# Patient Record
Sex: FEMALE | Race: WHITE | NOT HISPANIC OR LATINO | Employment: FULL TIME | URBAN - NONMETROPOLITAN AREA
[De-identification: names, ages, dates, MRNs, and addresses within clinical notes are randomized per-mention and may not be internally consistent; named-entity substitution may affect disease eponyms.]

---

## 2023-02-09 PROBLEM — R10.11 POSTPRANDIAL RUQ PAIN: Status: RESOLVED | Noted: 2023-02-09 | Resolved: 2023-02-09

## 2023-02-09 PROBLEM — J32.9 SINUSITIS: Status: RESOLVED | Noted: 2023-02-09 | Resolved: 2023-02-09

## 2023-02-09 PROBLEM — R10.9 FLANK PAIN: Status: RESOLVED | Noted: 2023-02-09 | Resolved: 2023-02-09

## 2023-02-09 PROBLEM — M54.9 BACK PAIN: Status: ACTIVE | Noted: 2023-02-09

## 2023-02-09 PROBLEM — R19.8 RECTAL PRESSURE: Status: RESOLVED | Noted: 2023-02-09 | Resolved: 2023-02-09

## 2023-02-09 PROBLEM — R10.9 ABDOMINAL PAIN: Status: RESOLVED | Noted: 2023-02-09 | Resolved: 2023-02-09

## 2023-02-09 PROBLEM — M54.2 NECK PAIN: Status: ACTIVE | Noted: 2023-02-09

## 2023-02-09 RX ORDER — TRAMADOL HYDROCHLORIDE 50 MG/1
50 TABLET ORAL EVERY 4 HOURS PRN
COMMUNITY
Start: 2021-03-20 | End: 2023-03-28

## 2023-02-09 RX ORDER — NALOXONE HYDROCHLORIDE 4 MG/.1ML
4 SPRAY NASAL AS NEEDED
COMMUNITY
Start: 2021-10-01 | End: 2023-09-01 | Stop reason: ALTCHOICE

## 2023-04-14 ENCOUNTER — OFFICE VISIT (OUTPATIENT)
Dept: PRIMARY CARE | Facility: CLINIC | Age: 63
End: 2023-04-14
Payer: COMMERCIAL

## 2023-04-14 VITALS
HEART RATE: 78 BPM | HEIGHT: 68 IN | SYSTOLIC BLOOD PRESSURE: 123 MMHG | DIASTOLIC BLOOD PRESSURE: 76 MMHG | WEIGHT: 132.7 LBS | BODY MASS INDEX: 20.11 KG/M2

## 2023-04-14 DIAGNOSIS — Z12.2 ENCOUNTER FOR SCREENING FOR LUNG CANCER: ICD-10-CM

## 2023-04-14 DIAGNOSIS — Z79.899 CONTROLLED SUBSTANCE AGREEMENT SIGNED: ICD-10-CM

## 2023-04-14 DIAGNOSIS — F17.200 SMOKER: ICD-10-CM

## 2023-04-14 DIAGNOSIS — Z13.220 SCREENING FOR LIPID DISORDERS: ICD-10-CM

## 2023-04-14 DIAGNOSIS — Z00.00 WELLNESS EXAMINATION: Primary | ICD-10-CM

## 2023-04-14 DIAGNOSIS — R53.83 FATIGUE, UNSPECIFIED TYPE: ICD-10-CM

## 2023-04-14 PROCEDURE — 99214 OFFICE O/P EST MOD 30 MIN: CPT | Performed by: INTERNAL MEDICINE

## 2023-04-14 PROCEDURE — 99396 PREV VISIT EST AGE 40-64: CPT | Performed by: INTERNAL MEDICINE

## 2023-04-14 PROCEDURE — 4004F PT TOBACCO SCREEN RCVD TLK: CPT | Performed by: INTERNAL MEDICINE

## 2023-04-14 ASSESSMENT — ENCOUNTER SYMPTOMS
RHINORRHEA: 0
HEADACHES: 0
DYSURIA: 0
APPETITE CHANGE: 0
NUMBNESS: 0
SHORTNESS OF BREATH: 0
EYE DISCHARGE: 0
CONSTIPATION: 0
WEAKNESS: 0
NECK PAIN: 0
FREQUENCY: 0
ABDOMINAL DISTENTION: 0
ABDOMINAL PAIN: 0
SORE THROAT: 0
HALLUCINATIONS: 0
BLOOD IN STOOL: 0
SINUS PRESSURE: 0
ACTIVITY CHANGE: 0
TROUBLE SWALLOWING: 0
COUGH: 0
NAUSEA: 0
DIZZINESS: 0
UNEXPECTED WEIGHT CHANGE: 0
VOMITING: 0
FATIGUE: 1
DIARRHEA: 0
ARTHRALGIAS: 0
FEVER: 0
NERVOUS/ANXIOUS: 0
BACK PAIN: 0

## 2023-04-14 ASSESSMENT — PATIENT HEALTH QUESTIONNAIRE - PHQ9
2. FEELING DOWN, DEPRESSED OR HOPELESS: NOT AT ALL
1. LITTLE INTEREST OR PLEASURE IN DOING THINGS: NOT AT ALL
SUM OF ALL RESPONSES TO PHQ9 QUESTIONS 1 AND 2: 0

## 2023-04-14 ASSESSMENT — LIFESTYLE VARIABLES: TOTAL SCORE: 0

## 2023-04-14 NOTE — PROGRESS NOTES
"Subjective   Patient ID: Cherelle Jimenez is a 62 y.o. female who presents for Follow-up (Patient complaining of tiredness. Patient inquiring about when she should have a \"physical\".).  HPI  Patient is here today for follow up with a chief complaint of  fatigue.  Pt currently is smoking 1 ppd and has for 25 years.   She has not been able to successfully quit for long.  Patient has nicotine patches without success , did not tolerate chantix or wellbutrin in the past.     Review of Systems   Constitutional:  Positive for fatigue. Negative for activity change, appetite change, fever and unexpected weight change.   HENT:  Negative for congestion, ear discharge, ear pain, nosebleeds, postnasal drip, rhinorrhea, sinus pressure, sneezing, sore throat, tinnitus and trouble swallowing.    Eyes:  Negative for discharge.   Respiratory:  Negative for cough and shortness of breath.    Cardiovascular:  Negative for chest pain.   Gastrointestinal:  Negative for abdominal distention, abdominal pain, blood in stool, constipation, diarrhea, nausea and vomiting.   Endocrine: Negative for cold intolerance.   Genitourinary:  Negative for dysuria and frequency.   Musculoskeletal:  Negative for arthralgias, back pain and neck pain.   Skin:  Negative for rash.   Neurological:  Negative for dizziness, weakness, numbness and headaches.   Psychiatric/Behavioral:  Negative for hallucinations. The patient is not nervous/anxious.        Objective   /76   Pulse 78   Ht 1.727 m (5' 8\")   Wt 60.2 kg (132 lb 11.2 oz)   BMI 20.18 kg/m²     Physical Exam  Constitutional:       General: She is not in acute distress.     Appearance: Normal appearance. She is not toxic-appearing.   HENT:      Head: Normocephalic and atraumatic.      Right Ear: Tympanic membrane, ear canal and external ear normal.      Left Ear: Tympanic membrane, ear canal and external ear normal.      Nose: Nose normal. No congestion.      Mouth/Throat:      Mouth: Mucous " membranes are moist.      Pharynx: Oropharynx is clear. No oropharyngeal exudate.   Eyes:      General: No scleral icterus.        Right eye: No discharge.         Left eye: No discharge.      Extraocular Movements: Extraocular movements intact.      Conjunctiva/sclera: Conjunctivae normal.      Pupils: Pupils are equal, round, and reactive to light.   Neck:      Vascular: No carotid bruit.   Cardiovascular:      Rate and Rhythm: Normal rate and regular rhythm.      Heart sounds: No murmur heard.     No friction rub. No gallop.   Pulmonary:      Effort: Pulmonary effort is normal. No respiratory distress.      Breath sounds: Normal breath sounds.   Abdominal:      General: Bowel sounds are normal. There is no distension.      Palpations: Abdomen is soft. There is no mass.      Tenderness: There is no abdominal tenderness. There is no guarding.   Musculoskeletal:         General: No swelling. Normal range of motion.      Cervical back: Normal range of motion and neck supple. No tenderness.   Lymphadenopathy:      Cervical: No cervical adenopathy.   Skin:     General: Skin is warm and dry.      Comments: Multiple solar lentigines, freckles, sun damange but no concerning moles, scattered hemangiomas, two dermatofibromas on right anterior shoulder    Neurological:      General: No focal deficit present.      Mental Status: She is alert and oriented to person, place, and time.   Psychiatric:         Mood and Affect: Mood normal.         Thought Content: Thought content normal.         Judgment: Judgment normal.         Flu shots declines   PNA: recommended   Shingles: recommended   COVID: received     Mammo 10 years ago, declines to update   Pap 10 years ago, declines to update   DEXA: --  Colonoscopy 8/21  Lung cancer screening: will order low dose lung cancer screening as has smoked for 25 years 1 ppd    OARRS:  Dinora Castellanos DO on 4/14/2023 11:19 AM  I have personally reviewed the OARRS report for Cherelle SOLIS  Barbara. I have considered the risks of abuse, dependence, addiction and diversion    Is the patient prescribed a combination of a benzodiazepine and opioid?  Yes, I feel it is clincially indicated to continue the medication and have discussed with the patient risks/benefits/alternatives.    Last Urine Drug Screen / ordered today: Yes  Recent Results (from the past 85951 hour(s))   OPIATE/OPIOID/BENZO PRESCRIPTION COMPLIANCE    Collection Time: 10/01/21 11:06 AM   Result Value Ref Range    DRUG SCREEN COMMENT URINE SEE BELOW     Creatine, Urine 181.1 mg/dL    Amphetamine Screen, Urine PRESUMPTIVE NEGATIVE NEGATIVE    Barbiturate Screen, Urine PRESUMPTIVE NEGATIVE NEGATIVE    Cannabinoid Screen, Urine PRESUMPTIVE NEGATIVE NEGATIVE    Cocaine Screen, Urine PRESUMPTIVE NEGATIVE NEGATIVE    PCP Screen, Urine PRESUMPTIVE NEGATIVE NEGATIVE    7-Aminoclonazepam <25 Cutoff <25 ng/mL    Alpha-Hydroxyalprazolam <25 Cutoff <25 ng/mL    Alpha-Hydroxymidazolam <25 Cutoff <25 ng/mL    Alprazolam <25 Cutoff <25 ng/mL    Chlordiazepoxide <25 Cutoff <25 ng/mL    Clonazepam <25 Cutoff <25 ng/mL    Diazepam <25 Cutoff <25 ng/mL    Lorazepam <25 Cutoff <25 ng/mL    Midazolam <25 Cutoff <25 ng/mL    Nordiazepam <25 Cutoff <25 ng/mL    Oxazepam <25 Cutoff <25 ng/mL    Temazepam <25 Cutoff <25 ng/mL    Zolpidem <25 Cutoff <25 ng/mL    Zolpidem Metabolite (ZCA) <25 Cutoff <25 ng/mL    6-Acetylmorphine <25 Cutoff <25 ng/mL    Codeine <50 Cutoff <50 ng/mL    Hydrocodone <25 Cutoff <25 ng/mL    Hydromorphone <25 Cutoff <25 ng/mL    Morphine Urine <50 Cutoff <50 ng/mL    Norhydrocodone <25 Cutoff <25 ng/mL    Noroxycodone <25 Cutoff <25 ng/mL    Oxycodone <25 Cutoff <25 ng/mL    Oxymorphone <25 Cutoff <25 ng/mL    Tramadol >1000 (A) Cutoff <50 ng/mL    O-Desmethyltramadol >1000 (A) Cutoff <50 ng/mL    Fentanyl <2.5 Cutoff<2.5 ng/mL    Norfentanyl <2.5 Cutoff<2.5 ng/mL    METHADONE CONFIRMATION,URINE <25 Cutoff <25 ng/mL    EDDP <25 Cutoff <25  ng/mL     N/A    Controlled Substance Agreement:  Date of the Last Agreement: 2023  Reviewed Controlled Substance Agreement including but not limited to the benefits, risks, and alternatives to treatment with a Controlled Substance medication(s).    Opioids:  What is the patient's goal of therapy? Improvement of pain  Is this being achieved with current treatment? yes    I have calculated the patient's Morphine Dose Equivalent (MED):   I have considered referral to Pain Management and/or a specialist, and do not feel it is necessary at this time.    I feel that it is clinically indicated to continue this current medication regimen after consideration of alternative therapies, and other non-opioid treatment.    Opioid Risk Screening:  Family History of Substance Abuse  Alcohol: 0 (2023 11:00 AM)  Illegal Dru (2023 11:00 AM)  Prescription Dru (2023 11:00 AM)    Personal History of Substance Abuse  Alcohol: 0 (2023 11:00 AM)  Illegal Drugs: 0 (2023 11:00 AM)  Prescription Drugs: 0 (2023 11:00 AM)    Patient Age (16-45)  Age (16-45): 0 (2023 11:00 AM)    History of Preadolescent Sexual Abuse  History of Preadolescent Sexual Abuse: 0 (2023 11:00 AM)    Psychological Disease  Attention Deficit Disorder, Obsessive Compulsive Disorder, Bipolar, Schizophrenia: 0 (2023 11:00 AM)  Depression: 0 (2023 11:00 AM)    Total Score  Total Score: 0 (2023 11:00 AM)    Total Score Risk Category  TOTAL SCORE CATEGORY: Low Risk (0-3) (2023 11:00 AM)        Pain Assessment:  Analgesia  What was your pain level on average during the past week?: 4  What was your pain level at its worst during the past week?: 6  What percentage of your pain has been relieved during the past week?: 80 %  Is the amount of pain relief you are now obtaining from your current pain relievers enough to make a real difference in your life?: Y  Query to Clinician: Is the patient's pain relief  clinically significant?: Yes    Activities of Daily Living  Physical Functioning: Better  Family Relationships: Better  Social Relationships: Better  Mood: Better  Sleep Patterns: Better  Overall Functioning: Better    Adverse Events  Is patient experiencing any side effects from current pain relievers?: N  Patient's Overall Severity of Side Effects: None      Assessment  Is your overall impression that this patient is benefiting from opioid therapy?: Yes  Specific Analgesic Plan: Continue present regimen          Assessment/Plan   Problem List Items Addressed This Visit          Other    Smoker     Other Visit Diagnoses       Wellness examination    -  Primary    Relevant Orders    Comprehensive Metabolic Panel    CBC and Auto Differential    Screening for lipid disorders        Relevant Orders    Lipid Panel    Fatigue, unspecified type        Relevant Orders    TSH with reflex to Free T4 if abnormal    Iron and TIBC    Vitamin B12    Vitamin D 25-Hydroxy,Total    Controlled substance agreement signed        Relevant Orders    Opiate/Opioid/Benzo Extended Prescription Compliance    Encounter for screening for lung cancer        Relevant Orders    CT lung screening low dose          1 Wellness exam  - will order cbc, cmp, lipid panel  - pt declines physical breast exam   - declines mammo  - declines pap   - colonoscopy up to date   - can do dexa next year at 63   - eligible for lung cancer screening CT chest with 25 years smoking 1 ppd   - discussed smoking cessation, has not had success with nicotine replacement, declines chantix or wellbutrin as she did not tolerate it in the past     2. 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 Chronic neck and back pain  - was seeing pain Management in [place], he retired   - I have personally reviewed this patient's OARRS report and found it to be appropriate. The report has been uploaded into  the medical record. I have considered the risks of abuse, addiction, dependence, and diversion and feel that it is clinically appropriate for this patient to be prescribed this controlled medication.  - updated CSA today, Ordered UDS  - neck injections did not help, was recommend to see neurosurgery for surgical eval but pt declined as she does not want surgery         Final diagnoses:   [Z00.00] Wellness examination   [Z13.220] Screening for lipid disorders   [R53.83] Fatigue, unspecified type   [Z79.899] Controlled substance agreement signed   [Z12.2] Encounter for screening for lung cancer   [F17.200] Smoker

## 2023-05-05 ENCOUNTER — LAB (OUTPATIENT)
Dept: LAB | Facility: LAB | Age: 63
End: 2023-05-05
Payer: COMMERCIAL

## 2023-05-05 DIAGNOSIS — Z79.899 CONTROLLED SUBSTANCE AGREEMENT SIGNED: ICD-10-CM

## 2023-05-05 DIAGNOSIS — R53.83 FATIGUE, UNSPECIFIED TYPE: ICD-10-CM

## 2023-05-05 DIAGNOSIS — Z00.00 WELLNESS EXAMINATION: ICD-10-CM

## 2023-05-05 DIAGNOSIS — Z13.220 SCREENING FOR LIPID DISORDERS: ICD-10-CM

## 2023-05-05 LAB
ALANINE AMINOTRANSFERASE (SGPT) (U/L) IN SER/PLAS: 17 U/L (ref 7–45)
ALBUMIN (G/DL) IN SER/PLAS: 4.2 G/DL (ref 3.4–5)
ALKALINE PHOSPHATASE (U/L) IN SER/PLAS: 71 U/L (ref 33–136)
ANION GAP IN SER/PLAS: 12 MMOL/L (ref 10–20)
ASPARTATE AMINOTRANSFERASE (SGOT) (U/L) IN SER/PLAS: 17 U/L (ref 9–39)
BASOPHILS (10*3/UL) IN BLOOD BY AUTOMATED COUNT: 0.04 X10E9/L (ref 0–0.1)
BASOPHILS/100 LEUKOCYTES IN BLOOD BY AUTOMATED COUNT: 0.7 % (ref 0–2)
BILIRUBIN TOTAL (MG/DL) IN SER/PLAS: 0.8 MG/DL (ref 0–1.2)
CALCIDIOL (25 OH VITAMIN D3) (NG/ML) IN SER/PLAS: 27 NG/ML
CALCIUM (MG/DL) IN SER/PLAS: 9.7 MG/DL (ref 8.6–10.3)
CARBON DIOXIDE, TOTAL (MMOL/L) IN SER/PLAS: 30 MMOL/L (ref 21–32)
CHLORIDE (MMOL/L) IN SER/PLAS: 104 MMOL/L (ref 98–107)
CHOLESTEROL (MG/DL) IN SER/PLAS: 231 MG/DL (ref 0–199)
CHOLESTEROL IN HDL (MG/DL) IN SER/PLAS: 66 MG/DL
CHOLESTEROL/HDL RATIO: 3.5
COBALAMIN (VITAMIN B12) (PG/ML) IN SER/PLAS: 239 PG/ML (ref 211–911)
CREATININE (MG/DL) IN SER/PLAS: 0.92 MG/DL (ref 0.5–1.05)
EOSINOPHILS (10*3/UL) IN BLOOD BY AUTOMATED COUNT: 0.18 X10E9/L (ref 0–0.7)
EOSINOPHILS/100 LEUKOCYTES IN BLOOD BY AUTOMATED COUNT: 3.1 % (ref 0–6)
ERYTHROCYTE DISTRIBUTION WIDTH (RATIO) BY AUTOMATED COUNT: 12.8 % (ref 11.5–14.5)
ERYTHROCYTE MEAN CORPUSCULAR HEMOGLOBIN CONCENTRATION (G/DL) BY AUTOMATED: 33 G/DL (ref 32–36)
ERYTHROCYTE MEAN CORPUSCULAR VOLUME (FL) BY AUTOMATED COUNT: 99 FL (ref 80–100)
ERYTHROCYTES (10*6/UL) IN BLOOD BY AUTOMATED COUNT: 4.6 X10E12/L (ref 4–5.2)
GFR FEMALE: 69 ML/MIN/1.73M2
GLUCOSE (MG/DL) IN SER/PLAS: 95 MG/DL (ref 74–99)
HEMATOCRIT (%) IN BLOOD BY AUTOMATED COUNT: 45.5 % (ref 36–46)
HEMOGLOBIN (G/DL) IN BLOOD: 15 G/DL (ref 12–16)
IMMATURE GRANULOCYTES/100 LEUKOCYTES IN BLOOD BY AUTOMATED COUNT: 0.3 % (ref 0–0.9)
IRON (UG/DL) IN SER/PLAS: 108 UG/DL (ref 35–150)
IRON BINDING CAPACITY (UG/DL) IN SER/PLAS: 360 UG/DL (ref 240–445)
IRON SATURATION (%) IN SER/PLAS: 30 % (ref 25–45)
LDL: 150 MG/DL (ref 0–99)
LEUKOCYTES (10*3/UL) IN BLOOD BY AUTOMATED COUNT: 5.9 X10E9/L (ref 4.4–11.3)
LYMPHOCYTES (10*3/UL) IN BLOOD BY AUTOMATED COUNT: 1.89 X10E9/L (ref 1.2–4.8)
LYMPHOCYTES/100 LEUKOCYTES IN BLOOD BY AUTOMATED COUNT: 32.3 % (ref 13–44)
MONOCYTES (10*3/UL) IN BLOOD BY AUTOMATED COUNT: 0.49 X10E9/L (ref 0.1–1)
MONOCYTES/100 LEUKOCYTES IN BLOOD BY AUTOMATED COUNT: 8.4 % (ref 2–10)
NEUTROPHILS (10*3/UL) IN BLOOD BY AUTOMATED COUNT: 3.23 X10E9/L (ref 1.2–7.7)
NEUTROPHILS/100 LEUKOCYTES IN BLOOD BY AUTOMATED COUNT: 55.2 % (ref 40–80)
PLATELETS (10*3/UL) IN BLOOD AUTOMATED COUNT: 238 X10E9/L (ref 150–450)
POTASSIUM (MMOL/L) IN SER/PLAS: 4.7 MMOL/L (ref 3.5–5.3)
PROTEIN TOTAL: 6.5 G/DL (ref 6.4–8.2)
SODIUM (MMOL/L) IN SER/PLAS: 141 MMOL/L (ref 136–145)
THYROTROPIN (MIU/L) IN SER/PLAS BY DETECTION LIMIT <= 0.05 MIU/L: 0.94 MIU/L (ref 0.44–3.98)
TRIGLYCERIDE (MG/DL) IN SER/PLAS: 74 MG/DL (ref 0–149)
UREA NITROGEN (MG/DL) IN SER/PLAS: 14 MG/DL (ref 6–23)
VLDL: 15 MG/DL (ref 0–40)

## 2023-05-05 PROCEDURE — 82607 VITAMIN B-12: CPT

## 2023-05-05 PROCEDURE — 80358 DRUG SCREENING METHADONE: CPT

## 2023-05-05 PROCEDURE — 80365 DRUG SCREENING OXYCODONE: CPT

## 2023-05-05 PROCEDURE — 83550 IRON BINDING TEST: CPT

## 2023-05-05 PROCEDURE — 83540 ASSAY OF IRON: CPT

## 2023-05-05 PROCEDURE — 80053 COMPREHEN METABOLIC PANEL: CPT

## 2023-05-05 PROCEDURE — 80368 SEDATIVE HYPNOTICS: CPT

## 2023-05-05 PROCEDURE — 36415 COLL VENOUS BLD VENIPUNCTURE: CPT

## 2023-05-05 PROCEDURE — 80346 BENZODIAZEPINES1-12: CPT

## 2023-05-05 PROCEDURE — 80361 OPIATES 1 OR MORE: CPT

## 2023-05-05 PROCEDURE — 80061 LIPID PANEL: CPT

## 2023-05-05 PROCEDURE — 84443 ASSAY THYROID STIM HORMONE: CPT

## 2023-05-05 PROCEDURE — 80307 DRUG TEST PRSMV CHEM ANLYZR: CPT

## 2023-05-05 PROCEDURE — 82306 VITAMIN D 25 HYDROXY: CPT

## 2023-05-05 PROCEDURE — 80354 DRUG SCREENING FENTANYL: CPT

## 2023-05-05 PROCEDURE — 80373 DRUG SCREENING TRAMADOL: CPT

## 2023-05-05 PROCEDURE — 85025 COMPLETE CBC W/AUTO DIFF WBC: CPT

## 2023-05-10 LAB
6-ACETYLMORPHINE: <25 NG/ML
7-AMINOCLONAZEPAM: <25 NG/ML
ALPHA-HYDROXYALPRAZOLAM: <25 NG/ML
ALPHA-HYDROXYMIDAZOLAM: <25 NG/ML
ALPRAZOLAM: <25 NG/ML
AMPHETAMINE (PRESENCE) IN URINE BY SCREEN METHOD: ABNORMAL
BARBITURATES PRESENCE IN URINE BY SCREEN METHOD: ABNORMAL
CANNABINOIDS IN URINE BY SCREEN METHOD: ABNORMAL
CHLORDIAZEPOXIDE: <25 NG/ML
CLONAZEPAM: <25 NG/ML
COCAINE (PRESENCE) IN URINE BY SCREEN METHOD: ABNORMAL
CODEINE: <50 NG/ML
CREATINE, URINE FOR DRUG: 105.9 MG/DL
DIAZEPAM: <25 NG/ML
DRUG SCREEN COMMENT URINE: ABNORMAL
EDDP: <25 NG/ML
FENTANYL CONFIRMATION, URINE: <2.5 NG/ML
HYDROCODONE: <25 NG/ML
HYDROMORPHONE: <25 NG/ML
LORAZEPAM: <25 NG/ML
METHADONE CONFIRMATION,URINE: <25 NG/ML
MIDAZOLAM: <25 NG/ML
MORPHINE URINE: <50 NG/ML
NORDIAZEPAM: <25 NG/ML
NORFENTANYL: <2.5 NG/ML
NORHYDROCODONE: <25 NG/ML
NOROXYCODONE: <25 NG/ML
O-DESMETHYLTRAMADOL: >1000 NG/ML
OXAZEPAM: <25 NG/ML
OXYCODONE: <25 NG/ML
OXYMORPHONE: <25 NG/ML
PHENCYCLIDINE (PRESENCE) IN URINE BY SCREEN METHOD: ABNORMAL
TEMAZEPAM: <25 NG/ML
TRAMADOL: 550 NG/ML
ZOLPIDEM METABOLITE (ZCA): <25 NG/ML
ZOLPIDEM: <25 NG/ML

## 2023-05-24 DIAGNOSIS — M54.2 CERVICALGIA: ICD-10-CM

## 2023-05-24 RX ORDER — TRAMADOL HYDROCHLORIDE 50 MG/1
TABLET ORAL
Qty: 120 TABLET | Refills: 0 | Status: SHIPPED | OUTPATIENT
Start: 2023-05-24 | End: 2023-07-24

## 2023-07-24 DIAGNOSIS — M54.2 CERVICALGIA: ICD-10-CM

## 2023-07-24 RX ORDER — TRAMADOL HYDROCHLORIDE 50 MG/1
TABLET ORAL
Qty: 120 TABLET | Refills: 0 | Status: SHIPPED | OUTPATIENT
Start: 2023-07-24 | End: 2023-09-12 | Stop reason: SDUPTHER

## 2023-08-21 ENCOUNTER — TELEPHONE (OUTPATIENT)
Dept: PRIMARY CARE | Facility: CLINIC | Age: 63
End: 2023-08-21
Payer: COMMERCIAL

## 2023-08-21 NOTE — TELEPHONE ENCOUNTER
Significant other called asking to stay anonymous     He is concerned about her opioid medication/addiction    She said she only takes it to calm her down not for pain when he asked her about    She couldn't tell him why she was on it and it caused a heated argument     He just wanted to bring this to your attention

## 2023-09-01 ENCOUNTER — TELEMEDICINE (OUTPATIENT)
Dept: PRIMARY CARE | Facility: CLINIC | Age: 63
End: 2023-09-01
Payer: COMMERCIAL

## 2023-09-01 DIAGNOSIS — M54.2 NECK PAIN: ICD-10-CM

## 2023-09-01 DIAGNOSIS — F17.200 SMOKER: Primary | ICD-10-CM

## 2023-09-01 DIAGNOSIS — M54.41 CHRONIC BILATERAL LOW BACK PAIN WITH BILATERAL SCIATICA: ICD-10-CM

## 2023-09-01 DIAGNOSIS — M54.42 CHRONIC BILATERAL LOW BACK PAIN WITH BILATERAL SCIATICA: ICD-10-CM

## 2023-09-01 DIAGNOSIS — G89.29 CHRONIC BILATERAL LOW BACK PAIN WITH BILATERAL SCIATICA: ICD-10-CM

## 2023-09-01 PROCEDURE — 99213 OFFICE O/P EST LOW 20 MIN: CPT | Performed by: INTERNAL MEDICINE

## 2023-09-01 ASSESSMENT — ENCOUNTER SYMPTOMS: BACK PAIN: 1

## 2023-09-01 NOTE — PROGRESS NOTES
Subjective   Patient ID: Cherelle Jimenez is a 62 y.o. female who presents for No chief complaint on file..  Back Pain      Patient is here today for telephone follow up.  Pt and physician are at two separate physical locations.   Pt was verbally consented for the encounter.  Pt unable to log onto virtual platform so was done by telephone.     Pt here for 3 mo follow up on Controlled substance.     Pt moved into [facility] with her boyfriend, is still working in [place].   Pt states that she has been tramadol 1-2 x a day, some days not taking it at all.     Review of Systems   Musculoskeletal:  Positive for back pain.       Objective   There were no vitals taken for this visit.    Physical Exam  No physical exam completed due to being telephone visit.     OARRS:  Dinora Castellanos DO on 4/14/2023 11:19 AM  I have personally reviewed the OARRS report for Cherelle Jimenez. I have considered the risks of abuse, dependence, addiction and diversion     Is the patient prescribed a combination of a benzodiazepine and opioid?  Yes, I feel it is clincially indicated to continue the medication and have discussed with the patient risks/benefits/alternatives.     Last Urine Drug Screen / ordered today: Yes  Recent Results         Recent Results (from the past 79946 hour(s))   OPIATE/OPIOID/BENZO PRESCRIPTION COMPLIANCE     Collection Time: 10/01/21 11:06 AM   Result Value Ref Range     DRUG SCREEN COMMENT URINE SEE BELOW       Creatine, Urine 181.1 mg/dL     Amphetamine Screen, Urine PRESUMPTIVE NEGATIVE NEGATIVE     Barbiturate Screen, Urine PRESUMPTIVE NEGATIVE NEGATIVE     Cannabinoid Screen, Urine PRESUMPTIVE NEGATIVE NEGATIVE     Cocaine Screen, Urine PRESUMPTIVE NEGATIVE NEGATIVE     PCP Screen, Urine PRESUMPTIVE NEGATIVE NEGATIVE     7-Aminoclonazepam <25 Cutoff <25 ng/mL     Alpha-Hydroxyalprazolam <25 Cutoff <25 ng/mL     Alpha-Hydroxymidazolam <25 Cutoff <25 ng/mL     Alprazolam <25 Cutoff <25 ng/mL      Chlordiazepoxide <25 Cutoff <25 ng/mL     Clonazepam <25 Cutoff <25 ng/mL     Diazepam <25 Cutoff <25 ng/mL     Lorazepam <25 Cutoff <25 ng/mL     Midazolam <25 Cutoff <25 ng/mL     Nordiazepam <25 Cutoff <25 ng/mL     Oxazepam <25 Cutoff <25 ng/mL     Temazepam <25 Cutoff <25 ng/mL     Zolpidem <25 Cutoff <25 ng/mL     Zolpidem Metabolite (ZCA) <25 Cutoff <25 ng/mL     6-Acetylmorphine <25 Cutoff <25 ng/mL     Codeine <50 Cutoff <50 ng/mL     Hydrocodone <25 Cutoff <25 ng/mL     Hydromorphone <25 Cutoff <25 ng/mL     Morphine Urine <50 Cutoff <50 ng/mL     Norhydrocodone <25 Cutoff <25 ng/mL     Noroxycodone <25 Cutoff <25 ng/mL     Oxycodone <25 Cutoff <25 ng/mL     Oxymorphone <25 Cutoff <25 ng/mL     Tramadol >1000 (A) Cutoff <50 ng/mL     O-Desmethyltramadol >1000 (A) Cutoff <50 ng/mL     Fentanyl <2.5 Cutoff<2.5 ng/mL     Norfentanyl <2.5 Cutoff<2.5 ng/mL     METHADONE CONFIRMATION,URINE <25 Cutoff <25 ng/mL     EDDP <25 Cutoff <25 ng/mL         N/A     Controlled Substance Agreement:  Date of the Last Agreement: 2023  Reviewed Controlled Substance Agreement including but not limited to the benefits, risks, and alternatives to treatment with a Controlled Substance medication(s).     Opioids:  What is the patient's goal of therapy? Improvement of pain  Is this being achieved with current treatment? yes     I have calculated the patient's Morphine Dose Equivalent (MED):   I have considered referral to Pain Management and/or a specialist, and do not feel it is necessary at this time.     I feel that it is clinically indicated to continue this current medication regimen after consideration of alternative therapies, and other non-opioid treatment.     Opioid Risk Screening:  Family History of Substance Abuse  Alcohol: 0 (2023 11:00 AM)  Illegal Dru (2023 11:00 AM)  Prescription Dru (2023 11:00 AM)     Personal History of Substance Abuse  Alcohol: 0 (2023 11:00 AM)  Illegal Drugs: 0  (4/14/2023 11:00 AM)  Prescription Drugs: 0 (4/14/2023 11:00 AM)     Patient Age (16-45)  Age (16-45): 0 (4/14/2023 11:00 AM)     History of Preadolescent Sexual Abuse  History of Preadolescent Sexual Abuse: 0 (4/14/2023 11:00 AM)     Psychological Disease  Attention Deficit Disorder, Obsessive Compulsive Disorder, Bipolar, Schizophrenia: 0 (4/14/2023 11:00 AM)  Depression: 0 (4/14/2023 11:00 AM)     Total Score  Total Score: 0 (4/14/2023 11:00 AM)     Total Score Risk Category  TOTAL SCORE CATEGORY: Low Risk (0-3) (4/14/2023 11:00 AM)           Pain Assessment:  Analgesia  What was your pain level on average during the past week?: 4  What was your pain level at its worst during the past week?: 6  What percentage of your pain has been relieved during the past week?: 80 %  Is the amount of pain relief you are now obtaining from your current pain relievers enough to make a real difference in your life?: Y  Query to Clinician: Is the patient's pain relief clinically significant?: Yes     Activities of Daily Living  Physical Functioning: Better  Family Relationships: Better  Social Relationships: Better  Mood: Better  Sleep Patterns: Better  Overall Functioning: Better     Adverse Events  Is patient experiencing any side effects from current pain relievers?: N  Patient's Overall Severity of Side Effects: None        Assessment  Is your overall impression that this patient is benefiting from opioid therapy?: Yes  Specific Analgesic Plan: Continue present regimen             Assessment/Plan   Problem List Items Addressed This Visit       Back pain    Smoker - Primary   1 Labs wnl except vit d low, recommend otc vit d          3. Chronic neck and back pain  - was seeing pain Management in [place], he retired   - I have personally reviewed this patient's OARRS report and found it to be appropriate. The report has been uploaded into the medical record. I have considered the risks of abuse, addiction, dependence, and  diversion and feel that it is clinically appropriate for this patient to be prescribed this controlled medication.  - CSA and UDS up to date and as expected   - continue tramadol 50mg po q6 prn   - neck injections did not help, was recommend to see neurosurgery for surgical eval but pt declined as she does not want surgery     10 min spent on telephone with pt  10 min spent reviewed labs and completing documentation     Final diagnoses:   [F17.200] Smoker   [M54.42, M54.41, G89.29] Chronic bilateral low back pain with bilateral sciatica

## 2023-09-12 DIAGNOSIS — M54.2 CERVICALGIA: ICD-10-CM

## 2023-09-12 RX ORDER — TRAMADOL HYDROCHLORIDE 50 MG/1
50 TABLET ORAL 4 TIMES DAILY PRN
Qty: 120 TABLET | Refills: 0 | Status: SHIPPED | OUTPATIENT
Start: 2023-09-12 | End: 2023-09-18 | Stop reason: SDUPTHER

## 2023-09-18 DIAGNOSIS — M54.2 CERVICALGIA: ICD-10-CM

## 2023-09-18 RX ORDER — TRAMADOL HYDROCHLORIDE 50 MG/1
50 TABLET ORAL 4 TIMES DAILY PRN
Qty: 120 TABLET | Refills: 0 | Status: SHIPPED | OUTPATIENT
Start: 2023-09-18 | End: 2023-11-09 | Stop reason: SDUPTHER

## 2023-11-09 DIAGNOSIS — M54.2 CERVICALGIA: ICD-10-CM

## 2023-11-09 RX ORDER — TRAMADOL HYDROCHLORIDE 50 MG/1
50 TABLET ORAL 4 TIMES DAILY PRN
Qty: 120 TABLET | Refills: 0 | Status: SHIPPED | OUTPATIENT
Start: 2023-11-09 | End: 2024-01-16

## 2023-12-01 ENCOUNTER — TELEMEDICINE (OUTPATIENT)
Dept: PRIMARY CARE | Facility: CLINIC | Age: 63
End: 2023-12-01
Payer: COMMERCIAL

## 2023-12-01 DIAGNOSIS — M54.2 NECK PAIN: Primary | ICD-10-CM

## 2023-12-01 PROCEDURE — 99213 OFFICE O/P EST LOW 20 MIN: CPT | Performed by: INTERNAL MEDICINE

## 2023-12-01 ASSESSMENT — ENCOUNTER SYMPTOMS: BACK PAIN: 1

## 2023-12-01 NOTE — PROGRESS NOTES
Subjective   Patient ID: Cherelle Jimenez is a 63 y.o. female who presents for Follow-up.  HPI  Patient is here today for virtual  follow up.  Pt and physician are at two separate physical locations.   Pt was verbally consented for the encounter.    Pt reports that everything is going ok, everything is about the same.       Review of Systems   Musculoskeletal:  Positive for back pain.       Objective   There were no vitals taken for this visit.    Physical Exam  No physical exam was completed due to being a virtual visit.     OARRS:  Dinora Castellanos DO on 4/14/2023 11:19 AM  I have personally reviewed the OARRS report for Cherlele Jimenez. I have considered the risks of abuse, dependence, addiction and diversion     Is the patient prescribed a combination of a benzodiazepine and opioid?  Yes, I feel it is clincially indicated to continue the medication and have discussed with the patient risks/benefits/alternatives.     Last Urine Drug Screen / ordered today: Yes  Recent Results             Recent Results (from the past 41374 hour(s))   OPIATE/OPIOID/BENZO PRESCRIPTION COMPLIANCE     Collection Time: 10/01/21 11:06 AM   Result Value Ref Range     DRUG SCREEN COMMENT URINE SEE BELOW       Creatine, Urine 181.1 mg/dL     Amphetamine Screen, Urine PRESUMPTIVE NEGATIVE NEGATIVE     Barbiturate Screen, Urine PRESUMPTIVE NEGATIVE NEGATIVE     Cannabinoid Screen, Urine PRESUMPTIVE NEGATIVE NEGATIVE     Cocaine Screen, Urine PRESUMPTIVE NEGATIVE NEGATIVE     PCP Screen, Urine PRESUMPTIVE NEGATIVE NEGATIVE     7-Aminoclonazepam <25 Cutoff <25 ng/mL     Alpha-Hydroxyalprazolam <25 Cutoff <25 ng/mL     Alpha-Hydroxymidazolam <25 Cutoff <25 ng/mL     Alprazolam <25 Cutoff <25 ng/mL     Chlordiazepoxide <25 Cutoff <25 ng/mL     Clonazepam <25 Cutoff <25 ng/mL     Diazepam <25 Cutoff <25 ng/mL     Lorazepam <25 Cutoff <25 ng/mL     Midazolam <25 Cutoff <25 ng/mL     Nordiazepam <25 Cutoff <25 ng/mL     Oxazepam <25 Cutoff  <25 ng/mL     Temazepam <25 Cutoff <25 ng/mL     Zolpidem <25 Cutoff <25 ng/mL     Zolpidem Metabolite (ZCA) <25 Cutoff <25 ng/mL     6-Acetylmorphine <25 Cutoff <25 ng/mL     Codeine <50 Cutoff <50 ng/mL     Hydrocodone <25 Cutoff <25 ng/mL     Hydromorphone <25 Cutoff <25 ng/mL     Morphine Urine <50 Cutoff <50 ng/mL     Norhydrocodone <25 Cutoff <25 ng/mL     Noroxycodone <25 Cutoff <25 ng/mL     Oxycodone <25 Cutoff <25 ng/mL     Oxymorphone <25 Cutoff <25 ng/mL     Tramadol >1000 (A) Cutoff <50 ng/mL     O-Desmethyltramadol >1000 (A) Cutoff <50 ng/mL     Fentanyl <2.5 Cutoff<2.5 ng/mL     Norfentanyl <2.5 Cutoff<2.5 ng/mL     METHADONE CONFIRMATION,URINE <25 Cutoff <25 ng/mL     EDDP <25 Cutoff <25 ng/mL         N/A     Controlled Substance Agreement:  Date of the Last Agreement: 2023  Reviewed Controlled Substance Agreement including but not limited to the benefits, risks, and alternatives to treatment with a Controlled Substance medication(s).     Opioids:  What is the patient's goal of therapy? Improvement of pain  Is this being achieved with current treatment? yes     I have calculated the patient's Morphine Dose Equivalent (MED):   I have considered referral to Pain Management and/or a specialist, and do not feel it is necessary at this time.     I feel that it is clinically indicated to continue this current medication regimen after consideration of alternative therapies, and other non-opioid treatment.     Opioid Risk Screening:  Family History of Substance Abuse  Alcohol: 0 (2023 11:00 AM)  Illegal Dru (2023 11:00 AM)  Prescription Dru (2023 11:00 AM)     Personal History of Substance Abuse  Alcohol: 0 (2023 11:00 AM)  Illegal Drugs: 0 (2023 11:00 AM)  Prescription Drugs: 0 (2023 11:00 AM)     Patient Age (16-45)  Age (16-45): 0 (2023 11:00 AM)     History of Preadolescent Sexual Abuse  History of Preadolescent Sexual Abuse: 0 (2023 11:00 AM)      Psychological Disease  Attention Deficit Disorder, Obsessive Compulsive Disorder, Bipolar, Schizophrenia: 0 (4/14/2023 11:00 AM)  Depression: 0 (4/14/2023 11:00 AM)     Total Score  Total Score: 0 (4/14/2023 11:00 AM)     Total Score Risk Category  TOTAL SCORE CATEGORY: Low Risk (0-3) (4/14/2023 11:00 AM)           Pain Assessment:  Analgesia  What was your pain level on average during the past week?: 4  What was your pain level at its worst during the past week?: 6  What percentage of your pain has been relieved during the past week?: 80 %  Is the amount of pain relief you are now obtaining from your current pain relievers enough to make a real difference in your life?: Y  Query to Clinician: Is the patient's pain relief clinically significant?: Yes     Activities of Daily Living  Physical Functioning: Better  Family Relationships: Better  Social Relationships: Better  Mood: Better  Sleep Patterns: Better  Overall Functioning: Better     Adverse Events  Is patient experiencing any side effects from current pain relievers?: N  Patient's Overall Severity of Side Effects: None        Assessment  Is your overall impression that this patient is benefiting from opioid therapy?: Yes  Specific Analgesic Plan: Continue present regimen                Assessment/Plan   Problem List Items Addressed This Visit       Neck pain - Primary      1.  Chronic neck and back pain  - was seeing pain Management in [place], he retired   - has failed more conservative measures such as gabapentin, lyrica, muscle relaxers, NSAID, tylenol, topical and has done injections   - I have personally reviewed this patient's OARRS report and found it to be appropriate. The report has been uploaded into the medical record. I have considered the risks of abuse, addiction, dependence, and diversion and feel that it is clinically appropriate for this patient to be prescribed this controlled medication.  - CSA and UDS up to date and as expected   -  continue tramadol 50mg po q6 prn (her last rx lasted her 3 mo takes it sparingly and appropriately) will have her come into the office next appt to update CSA and obtain new drug screen   - neck injections did not help, was recommend to see neurosurgery for surgical eval but pt declined as she does not want surgery      10 min spent on telephone with pt   5 min spent documenting   Final diagnoses:   [M54.2] Neck pain

## 2024-01-16 DIAGNOSIS — M54.2 CERVICALGIA: ICD-10-CM

## 2024-01-16 RX ORDER — TRAMADOL HYDROCHLORIDE 50 MG/1
50 TABLET ORAL 4 TIMES DAILY PRN
Qty: 120 TABLET | Refills: 0 | Status: SHIPPED | OUTPATIENT
Start: 2024-01-16 | End: 2024-03-21

## 2024-03-21 DIAGNOSIS — M54.2 CERVICALGIA: ICD-10-CM

## 2024-03-21 RX ORDER — TRAMADOL HYDROCHLORIDE 50 MG/1
50 TABLET ORAL 4 TIMES DAILY PRN
Qty: 120 TABLET | Refills: 0 | Status: SHIPPED | OUTPATIENT
Start: 2024-03-21 | End: 2024-05-28

## 2024-03-26 ENCOUNTER — APPOINTMENT (OUTPATIENT)
Dept: PRIMARY CARE | Facility: CLINIC | Age: 64
End: 2024-03-26
Payer: COMMERCIAL

## 2024-04-18 ENCOUNTER — OFFICE VISIT (OUTPATIENT)
Dept: PRIMARY CARE | Facility: CLINIC | Age: 64
End: 2024-04-18
Payer: COMMERCIAL

## 2024-04-18 VITALS
DIASTOLIC BLOOD PRESSURE: 77 MMHG | HEART RATE: 92 BPM | BODY MASS INDEX: 20.16 KG/M2 | WEIGHT: 133 LBS | HEIGHT: 68 IN | SYSTOLIC BLOOD PRESSURE: 132 MMHG

## 2024-04-18 DIAGNOSIS — Z79.899 CONTROLLED SUBSTANCE AGREEMENT SIGNED: Primary | ICD-10-CM

## 2024-04-18 DIAGNOSIS — E78.2 MIXED HYPERLIPIDEMIA: ICD-10-CM

## 2024-04-18 DIAGNOSIS — Z00.00 WELLNESS EXAMINATION: ICD-10-CM

## 2024-04-18 DIAGNOSIS — E55.9 VITAMIN D DEFICIENCY: ICD-10-CM

## 2024-04-18 DIAGNOSIS — E53.8 B12 DEFICIENCY: ICD-10-CM

## 2024-04-18 DIAGNOSIS — Z13.29 SCREENING FOR THYROID DISORDER: ICD-10-CM

## 2024-04-18 PROCEDURE — 99213 OFFICE O/P EST LOW 20 MIN: CPT | Performed by: INTERNAL MEDICINE

## 2024-04-18 PROCEDURE — 1159F MED LIST DOCD IN RCRD: CPT | Performed by: INTERNAL MEDICINE

## 2024-04-18 PROCEDURE — 1160F RVW MEDS BY RX/DR IN RCRD: CPT | Performed by: INTERNAL MEDICINE

## 2024-04-18 PROCEDURE — 99397 PER PM REEVAL EST PAT 65+ YR: CPT | Performed by: INTERNAL MEDICINE

## 2024-04-18 ASSESSMENT — PATIENT HEALTH QUESTIONNAIRE - PHQ9
SUM OF ALL RESPONSES TO PHQ9 QUESTIONS 1 AND 2: 0
1. LITTLE INTEREST OR PLEASURE IN DOING THINGS: NOT AT ALL
2. FEELING DOWN, DEPRESSED OR HOPELESS: NOT AT ALL

## 2024-04-18 ASSESSMENT — LIFESTYLE VARIABLES: TOTAL SCORE: 0

## 2024-04-18 NOTE — PROGRESS NOTES
"Subjective   Patient ID: Cherelle Jimenez is a 63 y.o. female who presents for Follow-up (4 month).  HPI    Review of Systems    Objective   /77   Pulse 92   Ht 1.727 m (5' 8\")   Wt 60.3 kg (133 lb)   BMI 20.22 kg/m²     Physical Exam    OARRS:  No data recorded  I have personally reviewed the OARRS report for Cherelle Jimenez. I have considered the risks of abuse, dependence, addiction and diversion    Is the patient prescribed a combination of a benzodiazepine and opioid?  No    Last Urine Drug Screen / ordered today: Yes  Recent Results (from the past 8760 hour(s))   OPIATE/OPIOID/BENZO PRESCRIPTION COMPLIANCE    Collection Time: 05/05/23  8:31 AM   Result Value Ref Range    DRUG SCREEN COMMENT URINE SEE BELOW     Creatine, Urine 105.9 mg/dL    Amphetamine Screen, Urine PRESUMPTIVE NEGATIVE NEGATIVE    Barbiturate Screen, Urine PRESUMPTIVE NEGATIVE NEGATIVE    Cannabinoid Screen, Urine PRESUMPTIVE NEGATIVE NEGATIVE    Cocaine Screen, Urine PRESUMPTIVE NEGATIVE NEGATIVE    PCP Screen, Urine PRESUMPTIVE NEGATIVE NEGATIVE    7-Aminoclonazepam <25 Cutoff <25 ng/mL    Alpha-Hydroxyalprazolam <25 Cutoff <25 ng/mL    Alpha-Hydroxymidazolam <25 Cutoff <25 ng/mL    Alprazolam <25 Cutoff <25 ng/mL    Chlordiazepoxide <25 Cutoff <25 ng/mL    Clonazepam <25 Cutoff <25 ng/mL    Diazepam <25 Cutoff <25 ng/mL    Lorazepam <25 Cutoff <25 ng/mL    Midazolam <25 Cutoff <25 ng/mL    Nordiazepam <25 Cutoff <25 ng/mL    Oxazepam <25 Cutoff <25 ng/mL    Temazepam <25 Cutoff <25 ng/mL    Zolpidem <25 Cutoff <25 ng/mL    Zolpidem Metabolite (ZCA) <25 Cutoff <25 ng/mL    6-Acetylmorphine <25 Cutoff <25 ng/mL    Codeine <50 Cutoff <50 ng/mL    Hydrocodone <25 Cutoff <25 ng/mL    Hydromorphone <25 Cutoff <25 ng/mL    Morphine Urine <50 Cutoff <50 ng/mL    Norhydrocodone <25 Cutoff <25 ng/mL    Noroxycodone <25 Cutoff <25 ng/mL    Oxycodone <25 Cutoff <25 ng/mL    Oxymorphone <25 Cutoff <25 ng/mL    Tramadol 550 (A) Cutoff <50 " ng/mL    O-Desmethyltramadol >1000 (A) Cutoff <50 ng/mL    Fentanyl <2.5 Cutoff<2.5 ng/mL    Norfentanyl <2.5 Cutoff<2.5 ng/mL    METHADONE CONFIRMATION,URINE <25 Cutoff <25 ng/mL    EDDP <25 Cutoff <25 ng/mL     N/A        Controlled Substance Agreement:  Date of the Last Agreement: 2024  Reviewed Controlled Substance Agreement including but not limited to the benefits, risks, and alternatives to treatment with a Controlled Substance medication(s).    Opioids:  What is the patient's goal of therapy? Improvement of pain   Is this being achieved with current treatment? Yes     I have calculated the patient's Morphine Dose Equivalent (MED):   I have considered referral to Pain Management and/or a specialist, and do not feel it is necessary at this time.    I feel that it is clinically indicated to continue this current medication regimen after consideration of alternative therapies, and other non-opioid treatment.    Opioid Risk Screening:  Family History of Substance Abuse  Alcohol: 0 (2024  4:00 PM)  Illegal Dru (2024  4:00 PM)  Prescription Dru (2024  4:00 PM)    Personal History of Substance Abuse  Alcohol: 0 (2024  4:00 PM)  Illegal Drugs: 0 (2024  4:00 PM)  Prescription Drugs: 0 (2024  4:00 PM)    Patient Age (16-45)  Age (16-45): 0 (2024  4:00 PM)    History of Preadolescent Sexual Abuse  History of Preadolescent Sexual Abuse: 0 (2024  4:00 PM)    Psychological Disease  Attention Deficit Disorder, Obsessive Compulsive Disorder, Bipolar, Schizophrenia: 0 (2024  4:00 PM)  Depression: 0 (2024  4:00 PM)    Total Score  Total Score: 0 (2024  4:00 PM)    Total Score Risk Category  TOTAL SCORE CATEGORY: Low Risk (0-3) (2024  4:00 PM)        Pain Assessment:  Analgesia  What was your pain level on average during the past week?: 7  What was your pain level at its worst during the past week?: 9  What percentage of your pain has been relieved during  the past week?: 3 %  Is the amount of pain relief you are now obtaining from your current pain relievers enough to make a real difference in your life?: Y  Query to Clinician: Is the patient's pain relief clinically significant?: Yes    Activities of Daily Living  Physical Functioning: Better  Family Relationships: Better  Social Relationships: Better  Mood: Better  Sleep Patterns: Better  Overall Functioning: Better    Adverse Events  Is patient experiencing any side effects from current pain relievers?: N  Patient's Overall Severity of Side Effects: None      Assessment  Is your overall impression that this patient is benefiting from opioid therapy?: Yes  Specific Analgesic Plan: Continue present regimen      Immunizations   Flu shot declines   COVID received   PNA recommended   Shingles recommended   RSV recommended     Colonoscopy 2021   Mammo declines   DEXA declines   Pap year ago       Assessment/Plan   Problem List Items Addressed This Visit    None  Visit Diagnoses       Controlled substance agreement signed    -  Primary    Relevant Orders    Opiate/Opioid/Benzo Prescription Compliance    Mixed hyperlipidemia        Relevant Orders    Lipid Panel    Comprehensive Metabolic Panel    Vitamin D deficiency        Relevant Orders    Vitamin D 25-Hydroxy,Total (for eval of Vitamin D levels)    B12 deficiency        Relevant Orders    Vitamin B12    Screening for thyroid disorder        Relevant Orders    TSH with reflex to Free T4 if abnormal           1.  Chronic neck and back pain  - was seeing pain Management in [place], he retired   - has failed more conservative measures such as gabapentin, lyrica, muscle relaxers, NSAID, tylenol, topical and has done injections   - I have personally reviewed this patient's OARRS report and found it to be appropriate. The report has been uploaded into the medical record. I have considered the risks of abuse, addiction, dependence, and diversion and feel that it is  clinically appropriate for this patient to be prescribed this controlled medication.  - CSA and UDS up dated today    - continue tramadol 50mg po q6 prn     2. Ordered screening cmp, lipid, tsh, b12     3. Vit d def, repeat     4. Declines to update mammo, dexa, pap   Final diagnoses:   [Z79.899] Controlled substance agreement signed   [E78.2] Mixed hyperlipidemia   [E55.9] Vitamin D deficiency   [E53.8] B12 deficiency   [Z13.29] Screening for thyroid disorder

## 2024-05-25 DIAGNOSIS — M54.2 CERVICALGIA: ICD-10-CM

## 2024-05-28 RX ORDER — TRAMADOL HYDROCHLORIDE 50 MG/1
50 TABLET ORAL 4 TIMES DAILY PRN
Qty: 120 TABLET | Refills: 0 | Status: SHIPPED | OUTPATIENT
Start: 2024-05-28

## 2024-07-18 ENCOUNTER — APPOINTMENT (OUTPATIENT)
Dept: PRIMARY CARE | Facility: CLINIC | Age: 64
End: 2024-07-18
Payer: COMMERCIAL

## 2024-07-18 DIAGNOSIS — G89.29 CHRONIC BILATERAL LOW BACK PAIN WITH BILATERAL SCIATICA: ICD-10-CM

## 2024-07-18 DIAGNOSIS — M54.41 CHRONIC BILATERAL LOW BACK PAIN WITH BILATERAL SCIATICA: ICD-10-CM

## 2024-07-18 DIAGNOSIS — R35.0 URINARY FREQUENCY: ICD-10-CM

## 2024-07-18 DIAGNOSIS — F17.200 SMOKER: ICD-10-CM

## 2024-07-18 DIAGNOSIS — R35.89 POLYURIA: Primary | ICD-10-CM

## 2024-07-18 DIAGNOSIS — M54.42 CHRONIC BILATERAL LOW BACK PAIN WITH BILATERAL SCIATICA: ICD-10-CM

## 2024-07-18 DIAGNOSIS — M54.2 NECK PAIN: ICD-10-CM

## 2024-07-18 PROCEDURE — 1160F RVW MEDS BY RX/DR IN RCRD: CPT | Performed by: INTERNAL MEDICINE

## 2024-07-18 PROCEDURE — 99214 OFFICE O/P EST MOD 30 MIN: CPT | Performed by: INTERNAL MEDICINE

## 2024-07-18 PROCEDURE — 1159F MED LIST DOCD IN RCRD: CPT | Performed by: INTERNAL MEDICINE

## 2024-07-18 ASSESSMENT — ENCOUNTER SYMPTOMS: ARTHRALGIAS: 0
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
## 2024-07-31 ENCOUNTER — PATIENT MESSAGE (OUTPATIENT)
Dept: PRIMARY CARE | Facility: CLINIC | Age: 64
End: 2024-07-31
Payer: COMMERCIAL

## 2024-07-31 DIAGNOSIS — M54.2 CERVICALGIA: ICD-10-CM

## 2024-08-13 RX ORDER — TRAMADOL HYDROCHLORIDE 50 MG/1
50 TABLET ORAL 4 TIMES DAILY PRN
Qty: 120 TABLET | Refills: 0 | Status: SHIPPED | OUTPATIENT
Start: 2024-08-13

## 2024-10-24 ENCOUNTER — APPOINTMENT (OUTPATIENT)
Dept: PRIMARY CARE | Facility: CLINIC | Age: 64
End: 2024-10-24
Payer: COMMERCIAL

## 2024-10-24 DIAGNOSIS — M54.9 CHRONIC NECK AND BACK PAIN: ICD-10-CM

## 2024-10-24 DIAGNOSIS — R82.90 FOUL SMELLING URINE: Primary | ICD-10-CM

## 2024-10-24 DIAGNOSIS — M54.2 CHRONIC NECK AND BACK PAIN: ICD-10-CM

## 2024-10-24 DIAGNOSIS — G89.29 CHRONIC NECK AND BACK PAIN: ICD-10-CM

## 2024-10-24 DIAGNOSIS — M54.2 CERVICALGIA: ICD-10-CM

## 2024-10-24 PROCEDURE — 1159F MED LIST DOCD IN RCRD: CPT | Performed by: INTERNAL MEDICINE

## 2024-10-24 PROCEDURE — 1160F RVW MEDS BY RX/DR IN RCRD: CPT | Performed by: INTERNAL MEDICINE

## 2024-10-24 PROCEDURE — 99213 OFFICE O/P EST LOW 20 MIN: CPT | Performed by: INTERNAL MEDICINE

## 2024-10-24 RX ORDER — TRAMADOL HYDROCHLORIDE 50 MG/1
50 TABLET ORAL 4 TIMES DAILY PRN
Qty: 120 TABLET | Refills: 0 | Status: SHIPPED | OUTPATIENT
Start: 2024-10-24

## 2024-10-24 ASSESSMENT — ENCOUNTER SYMPTOMS: BACK PAIN: 1

## 2024-10-24 NOTE — PROGRESS NOTES
Subjective   Patient ID: Cherelle Jimenez is a 63 y.o. female who presents for No chief complaint on file..  HPI  Patient is here today for 3 mo follow up     Pt and physician are at two separate locations .  Pt was verbally consented for the encounter.     Pt has broken up with her boyfriend and moved back to [place].     She reports some increasing back pain after mowing the lawn.     Pt reports foul smelling urine.     Review of Systems   Musculoskeletal:  Positive for back pain.       Objective   There were no vitals taken for this visit.    Physical Exam  No physical exam was completed due to virtual visit.     OARRS:  No data recorded  I have personally reviewed the OARRS report for Cherelle Jimenez. I have considered the risks of abuse, dependence, addiction and diversion     Is the patient prescribed a combination of a benzodiazepine and opioid?  No     Last Urine Drug Screen / ordered today: Yes  Recent Results             Recent Results (from the past 8760 hour(s))   OPIATE/OPIOID/BENZO PRESCRIPTION COMPLIANCE     Collection Time: 05/05/23  8:31 AM   Result Value Ref Range     DRUG SCREEN COMMENT URINE SEE BELOW       Creatine, Urine 105.9 mg/dL     Amphetamine Screen, Urine PRESUMPTIVE NEGATIVE NEGATIVE     Barbiturate Screen, Urine PRESUMPTIVE NEGATIVE NEGATIVE     Cannabinoid Screen, Urine PRESUMPTIVE NEGATIVE NEGATIVE     Cocaine Screen, Urine PRESUMPTIVE NEGATIVE NEGATIVE     PCP Screen, Urine PRESUMPTIVE NEGATIVE NEGATIVE     7-Aminoclonazepam <25 Cutoff <25 ng/mL     Alpha-Hydroxyalprazolam <25 Cutoff <25 ng/mL     Alpha-Hydroxymidazolam <25 Cutoff <25 ng/mL     Alprazolam <25 Cutoff <25 ng/mL     Chlordiazepoxide <25 Cutoff <25 ng/mL     Clonazepam <25 Cutoff <25 ng/mL     Diazepam <25 Cutoff <25 ng/mL     Lorazepam <25 Cutoff <25 ng/mL     Midazolam <25 Cutoff <25 ng/mL     Nordiazepam <25 Cutoff <25 ng/mL     Oxazepam <25 Cutoff <25 ng/mL     Temazepam <25 Cutoff <25 ng/mL     Zolpidem <25  Cutoff <25 ng/mL     Zolpidem Metabolite (ZCA) <25 Cutoff <25 ng/mL     6-Acetylmorphine <25 Cutoff <25 ng/mL     Codeine <50 Cutoff <50 ng/mL     Hydrocodone <25 Cutoff <25 ng/mL     Hydromorphone <25 Cutoff <25 ng/mL     Morphine Urine <50 Cutoff <50 ng/mL     Norhydrocodone <25 Cutoff <25 ng/mL     Noroxycodone <25 Cutoff <25 ng/mL     Oxycodone <25 Cutoff <25 ng/mL     Oxymorphone <25 Cutoff <25 ng/mL     Tramadol 550 (A) Cutoff <50 ng/mL     O-Desmethyltramadol >1000 (A) Cutoff <50 ng/mL     Fentanyl <2.5 Cutoff<2.5 ng/mL     Norfentanyl <2.5 Cutoff<2.5 ng/mL     METHADONE CONFIRMATION,URINE <25 Cutoff <25 ng/mL     EDDP <25 Cutoff <25 ng/mL         N/A           Controlled Substance Agreement:  Date of the Last Agreement: 2024  Reviewed Controlled Substance Agreement including but not limited to the benefits, risks, and alternatives to treatment with a Controlled Substance medication(s).     Opioids:  What is the patient's goal of therapy? Improvement of pain   Is this being achieved with current treatment? Yes      I have calculated the patient's Morphine Dose Equivalent (MED):   I have considered referral to Pain Management and/or a specialist, and do not feel it is necessary at this time.     I feel that it is clinically indicated to continue this current medication regimen after consideration of alternative therapies, and other non-opioid treatment.     Opioid Risk Screening:  Family History of Substance Abuse  Alcohol: 0 (2024  4:00 PM)  Illegal Dru (2024  4:00 PM)  Prescription Dru (2024  4:00 PM)     Personal History of Substance Abuse  Alcohol: 0 (2024  4:00 PM)  Illegal Drugs: 0 (2024  4:00 PM)  Prescription Drugs: 0 (2024  4:00 PM)     Patient Age (16-45)  Age (16-45): 0 (2024  4:00 PM)     History of Preadolescent Sexual Abuse  History of Preadolescent Sexual Abuse: 0 (2024  4:00 PM)     Psychological Disease  Attention Deficit Disorder,  Obsessive Compulsive Disorder, Bipolar, Schizophrenia: 0 (4/18/2024  4:00 PM)  Depression: 0 (4/18/2024  4:00 PM)     Total Score  Total Score: 0 (4/18/2024  4:00 PM)     Total Score Risk Category  TOTAL SCORE CATEGORY: Low Risk (0-3) (4/18/2024  4:00 PM)           Pain Assessment:  Analgesia  What was your pain level on average during the past week?: 7  What was your pain level at its worst during the past week?: 9  What percentage of your pain has been relieved during the past week?: 3 %  Is the amount of pain relief you are now obtaining from your current pain relievers enough to make a real difference in your life?: Y  Query to Clinician: Is the patient's pain relief clinically significant?: Yes     Activities of Daily Living  Physical Functioning: Better  Family Relationships: Better  Social Relationships: Better  Mood: Better  Sleep Patterns: Better  Overall Functioning: Better     Adverse Events  Is patient experiencing any side effects from current pain relievers?: N  Patient's Overall Severity of Side Effects: None        Assessment  Is your overall impression that this patient is benefiting from opioid therapy?: Yes  Specific Analgesic Plan: Continue present regimen          Assessment/Plan   Problem List Items Addressed This Visit       Chronic neck and back pain     Other Visit Diagnoses       Foul smelling urine    -  Primary    Relevant Orders    Urinalysis with Reflex Microscopic    Urine Culture    Comprehensive Metabolic Panel    CBC and Auto Differential    Cervicalgia        Relevant Medications    traMADol (Ultram) 50 mg tablet          Immunizations   Flu shot declines   COVID received   PNA recommended   Shingles recommended   RSV recommended      Colonoscopy 2021   Mammo declines   DEXA declines   Pap year ago      1.  Chronic neck and back pain  - was seeing pain Management in [place], he retired   - has failed more conservative measures such as gabapentin, lyrica, muscle relaxers, NSAID,  tylenol, topical and has done injections   - I have personally reviewed this patient's OARRS report and found it to be appropriate. The report has been uploaded into the medical record. I have considered the risks of abuse, addiction, dependence, and diversion and feel that it is clinically appropriate for this patient to be prescribed this controlled medication.  - CSA and UDS up to date as expected  - continue tramadol 50mg po q6 prn      2. Foul smelling urine, will check cbc, cmp, ua and culture      3. Vit d def, 28      4. Declines to update mammo, dexa, pap      5. Unintentional weight loss. Likely due to stress  - weight up to 130 lbs   - if lweight drops again, would recommend gi eval for endoscopy, consider savage braden   Final diagnoses:   [R82.90] Foul smelling urine   [M54.2] Cervicalgia   [M54.2, M54.9, G89.29] Chronic neck and back pain

## 2025-01-12 DIAGNOSIS — M54.2 CERVICALGIA: ICD-10-CM

## 2025-01-13 RX ORDER — TRAMADOL HYDROCHLORIDE 50 MG/1
50 TABLET ORAL 4 TIMES DAILY PRN
Qty: 120 TABLET | Refills: 0 | Status: SHIPPED | OUTPATIENT
Start: 2025-01-13

## 2025-01-29 ENCOUNTER — APPOINTMENT (OUTPATIENT)
Dept: PRIMARY CARE | Facility: CLINIC | Age: 65
End: 2025-01-29
Payer: COMMERCIAL

## 2025-01-29 DIAGNOSIS — M54.2 CHRONIC NECK AND BACK PAIN: Primary | ICD-10-CM

## 2025-01-29 DIAGNOSIS — M54.9 CHRONIC NECK AND BACK PAIN: Primary | ICD-10-CM

## 2025-01-29 DIAGNOSIS — G89.29 CHRONIC NECK AND BACK PAIN: Primary | ICD-10-CM

## 2025-01-29 PROCEDURE — 1159F MED LIST DOCD IN RCRD: CPT | Performed by: INTERNAL MEDICINE

## 2025-01-29 PROCEDURE — 1160F RVW MEDS BY RX/DR IN RCRD: CPT | Performed by: INTERNAL MEDICINE

## 2025-01-29 PROCEDURE — 99213 OFFICE O/P EST LOW 20 MIN: CPT | Performed by: INTERNAL MEDICINE

## 2025-01-29 ASSESSMENT — ENCOUNTER SYMPTOMS: BACK PAIN: 1

## 2025-01-29 NOTE — PROGRESS NOTES
Subjective   Patient ID: Cherelle Jimenez is a 64 y.o. female who presents for No chief complaint on file..  HPI  Patient and physician are at separate locations.   Pt was verbally consented for the encounter.     Pt reports that her weight is up to 130 lbs.   She had lost some weight again after ending relationship but has gained it back.     She reports that she is overall doing well and has no concerns today.     Review of Systems   Musculoskeletal:  Positive for back pain.       Objective   There were no vitals taken for this visit.    Physical Exam  No physical exam was completed due to telephone visit.     OARRS:  No data recorded  I have personally reviewed the OARRS report for Cherelle Jimenez. I have considered the risks of abuse, dependence, addiction and diversion     Is the patient prescribed a combination of a benzodiazepine and opioid?  No     Last Urine Drug Screen / ordered today: Yes  Recent Results             Recent Results (from the past 8760 hour(s))   OPIATE/OPIOID/BENZO PRESCRIPTION COMPLIANCE     Collection Time: 05/05/23  8:31 AM   Result Value Ref Range     DRUG SCREEN COMMENT URINE SEE BELOW       Creatine, Urine 105.9 mg/dL     Amphetamine Screen, Urine PRESUMPTIVE NEGATIVE NEGATIVE     Barbiturate Screen, Urine PRESUMPTIVE NEGATIVE NEGATIVE     Cannabinoid Screen, Urine PRESUMPTIVE NEGATIVE NEGATIVE     Cocaine Screen, Urine PRESUMPTIVE NEGATIVE NEGATIVE     PCP Screen, Urine PRESUMPTIVE NEGATIVE NEGATIVE     7-Aminoclonazepam <25 Cutoff <25 ng/mL     Alpha-Hydroxyalprazolam <25 Cutoff <25 ng/mL     Alpha-Hydroxymidazolam <25 Cutoff <25 ng/mL     Alprazolam <25 Cutoff <25 ng/mL     Chlordiazepoxide <25 Cutoff <25 ng/mL     Clonazepam <25 Cutoff <25 ng/mL     Diazepam <25 Cutoff <25 ng/mL     Lorazepam <25 Cutoff <25 ng/mL     Midazolam <25 Cutoff <25 ng/mL     Nordiazepam <25 Cutoff <25 ng/mL     Oxazepam <25 Cutoff <25 ng/mL     Temazepam <25 Cutoff <25 ng/mL     Zolpidem <25 Cutoff  <25 ng/mL     Zolpidem Metabolite (ZCA) <25 Cutoff <25 ng/mL     6-Acetylmorphine <25 Cutoff <25 ng/mL     Codeine <50 Cutoff <50 ng/mL     Hydrocodone <25 Cutoff <25 ng/mL     Hydromorphone <25 Cutoff <25 ng/mL     Morphine Urine <50 Cutoff <50 ng/mL     Norhydrocodone <25 Cutoff <25 ng/mL     Noroxycodone <25 Cutoff <25 ng/mL     Oxycodone <25 Cutoff <25 ng/mL     Oxymorphone <25 Cutoff <25 ng/mL     Tramadol 550 (A) Cutoff <50 ng/mL     O-Desmethyltramadol >1000 (A) Cutoff <50 ng/mL     Fentanyl <2.5 Cutoff<2.5 ng/mL     Norfentanyl <2.5 Cutoff<2.5 ng/mL     METHADONE CONFIRMATION,URINE <25 Cutoff <25 ng/mL     EDDP <25 Cutoff <25 ng/mL         N/A           Controlled Substance Agreement:  Date of the Last Agreement: 2024  Reviewed Controlled Substance Agreement including but not limited to the benefits, risks, and alternatives to treatment with a Controlled Substance medication(s).     Opioids:  What is the patient's goal of therapy? Improvement of pain   Is this being achieved with current treatment? Yes      I have calculated the patient's Morphine Dose Equivalent (MED):   I have considered referral to Pain Management and/or a specialist, and do not feel it is necessary at this time.     I feel that it is clinically indicated to continue this current medication regimen after consideration of alternative therapies, and other non-opioid treatment.     Opioid Risk Screening:  Family History of Substance Abuse  Alcohol: 0 (2024  4:00 PM)  Illegal Dru (2024  4:00 PM)  Prescription Dru (2024  4:00 PM)     Personal History of Substance Abuse  Alcohol: 0 (2024  4:00 PM)  Illegal Drugs: 0 (2024  4:00 PM)  Prescription Drugs: 0 (2024  4:00 PM)     Patient Age (16-45)  Age (16-45): 0 (2024  4:00 PM)     History of Preadolescent Sexual Abuse  History of Preadolescent Sexual Abuse: 0 (2024  4:00 PM)     Psychological Disease  Attention Deficit Disorder, Obsessive  Compulsive Disorder, Bipolar, Schizophrenia: 0 (4/18/2024  4:00 PM)  Depression: 0 (4/18/2024  4:00 PM)     Total Score  Total Score: 0 (4/18/2024  4:00 PM)     Total Score Risk Category  TOTAL SCORE CATEGORY: Low Risk (0-3) (4/18/2024  4:00 PM)           Pain Assessment:  Analgesia  What was your pain level on average during the past week?: 7  What was your pain level at its worst during the past week?: 9  What percentage of your pain has been relieved during the past week?: 3 %  Is the amount of pain relief you are now obtaining from your current pain relievers enough to make a real difference in your life?: Y  Query to Clinician: Is the patient's pain relief clinically significant?: Yes     Activities of Daily Living  Physical Functioning: Better  Family Relationships: Better  Social Relationships: Better  Mood: Better  Sleep Patterns: Better  Overall Functioning: Better     Adverse Events  Is patient experiencing any side effects from current pain relievers?: N  Patient's Overall Severity of Side Effects: None        Assessment  Is your overall impression that this patient is benefiting from opioid therapy?: Yes  Specific Analgesic Plan: Continue present regimen     Assessment/Plan   Problem List Items Addressed This Visit       Chronic neck and back pain - Primary        1.  Chronic neck and back pain  - was seeing pain Management in [place], he retired   - has failed more conservative measures such as gabapentin, lyrica, muscle relaxers, NSAID, tylenol, topical and has done injections   - I have personally reviewed this patient's OARRS report and found it to be appropriate. The report has been uploaded into the medical record. I have considered the risks of abuse, addiction, dependence, and diversion and feel that it is clinically appropriate for this patient to be prescribed this controlled medication.  - CSA and UDS up to date as expected  - continue tramadol 50mg po q6 prn but lasts her 2-3 mo per fill  usually      2. Unintentional weight loss. Likely due to stress  - weight up to 130 lbs   - if lweight drops again, would recommend gi eval for endoscopy, consider [facility] br[facility]     Pt aware that I will be leaving  at the end of Feb 25.   Final diagnoses:   [M54.2, M54.9, G89.29] Chronic neck and back pain